# Patient Record
Sex: FEMALE | URBAN - METROPOLITAN AREA
[De-identification: names, ages, dates, MRNs, and addresses within clinical notes are randomized per-mention and may not be internally consistent; named-entity substitution may affect disease eponyms.]

---

## 2020-06-02 ENCOUNTER — APPOINTMENT (RX ONLY)
Dept: URBAN - METROPOLITAN AREA CLINIC 74 | Facility: CLINIC | Age: 25
Setting detail: DERMATOLOGY
End: 2020-06-02

## 2020-06-02 VITALS — TEMPERATURE: 96.9 F

## 2020-06-02 DIAGNOSIS — L30.9 DERMATITIS, UNSPECIFIED: ICD-10-CM

## 2020-06-02 PROCEDURE — ? COUNSELING

## 2020-06-02 PROCEDURE — ? OTHER

## 2020-06-02 PROCEDURE — ? TREATMENT REGIMEN

## 2020-06-02 PROCEDURE — 99202 OFFICE O/P NEW SF 15 MIN: CPT

## 2020-06-02 PROCEDURE — ? PRESCRIPTION

## 2020-06-02 RX ORDER — DESONIDE 0.5 MG/G
CREAM TOPICAL BID
Qty: 1 | Refills: 1 | Status: ERX | COMMUNITY
Start: 2020-06-02

## 2020-06-02 RX ADMIN — DESONIDE: 0.5 CREAM TOPICAL at 00:00

## 2020-06-02 ASSESSMENT — PAIN INTENSITY VAS: HOW INTENSE IS YOUR PAIN 0 BEING NO PAIN, 10 BEING THE MOST SEVERE PAIN POSSIBLE?: NO PAIN

## 2020-06-02 NOTE — PROCEDURE: TREATMENT REGIMEN
Otc Regimen: Vaseline or petroleum jelly - use on lips daily or as often as needed
Samples Given: Synalar cream x 1
Detail Level: Zone
Initiate Treatment: desonide 0.05 % topical cream: Apply to affected areas on face twice a day x 2 weeks

## 2020-06-02 NOTE — PROCEDURE: OTHER
Note Text (......Xxx Chief Complaint.): This diagnosis correlates with the
Detail Level: Zone
Other (Free Text): - pt reports lips are swollen \\n- pt reports prickly sensation on face \\n- disc rash is on cheeks, lips, mouth, and left arm\\n- pt uses Chris’s toothpaste, Avene cicalfate, nystatin\\n- pt to stop neosporin and Dr. Cowart’s Mint cleanser\\n- pt to stop Avene moisturizer for at least 2 weeks now.\\n- disc would send topical steroid cream.\\n- pt used neosporin the day before rash started.\\n- disc rash appears to be something pt used or was given at dentist\\n- Disc if pt gets tongue swelling call our office